# Patient Record
Sex: FEMALE | Race: WHITE | HISPANIC OR LATINO | URBAN - METROPOLITAN AREA
[De-identification: names, ages, dates, MRNs, and addresses within clinical notes are randomized per-mention and may not be internally consistent; named-entity substitution may affect disease eponyms.]

---

## 2018-06-05 ENCOUNTER — EMERGENCY (EMERGENCY)
Facility: HOSPITAL | Age: 23
LOS: 1 days | Discharge: HOME | End: 2018-06-05
Attending: EMERGENCY MEDICINE
Payer: COMMERCIAL

## 2018-06-05 VITALS
SYSTOLIC BLOOD PRESSURE: 135 MMHG | RESPIRATION RATE: 18 BRPM | DIASTOLIC BLOOD PRESSURE: 75 MMHG | WEIGHT: 216.93 LBS | HEIGHT: 66 IN | OXYGEN SATURATION: 98 % | TEMPERATURE: 98 F | HEART RATE: 98 BPM

## 2018-06-05 VITALS — DIASTOLIC BLOOD PRESSURE: 78 MMHG | HEART RATE: 88 BPM | TEMPERATURE: 98 F | SYSTOLIC BLOOD PRESSURE: 125 MMHG

## 2018-06-05 PROCEDURE — 99284 EMERGENCY DEPT VISIT MOD MDM: CPT | Mod: 25

## 2018-06-05 PROCEDURE — 99284 EMERGENCY DEPT VISIT MOD MDM: CPT

## 2018-06-05 RX ORDER — IBUPROFEN 200 MG
600 TABLET ORAL ONCE
Qty: 0 | Refills: 0 | Status: COMPLETED | OUTPATIENT
Start: 2018-06-05 | End: 2018-06-05

## 2018-06-05 RX ADMIN — Medication 600 MILLIGRAM(S): at 09:40

## 2018-06-05 RX ADMIN — Medication 40 MILLIGRAM(S): at 10:37

## 2018-06-05 RX ADMIN — Medication 600 MILLIGRAM(S): at 10:33

## 2018-06-05 NOTE — ED PROVIDER NOTE - OBJECTIVE STATEMENT
23 y/o F with no significant PMHx and no significant PSHx presents to the ED with c/o right arm tingling, pain and swelling x3 days. Pt reports pain may be related to recent job where she often lifts and carries heavy boxes. Pt states pain began in the palm of her hand and has since radiated up her arm. Pt took Aleve without relief of symptoms. Denies weakness, numbness or any other complaints. NKDA.

## 2018-06-05 NOTE — ED PROVIDER NOTE - MEDICAL DECISION MAKING DETAILS
feels better. discussed anticipatory guidance. return if not improving, if worsens, unable to follow up or any sx of concern

## 2018-06-06 NOTE — CONSULT NOTE ADULT - SUBJECTIVE AND OBJECTIVE BOX
HPI: Patient is a 21 y/o F who presents to the ED with c/o right arm tingling, pain and swelling x3 days. Pt reports pain may be related to recent job where she often lifts and carries heavy boxes. Pt states pain began in the palm of her hand and has since radiated up her arm.    PAST MEDICAL & SURGICAL HISTORY:  No pertinent past medical history  No significant past surgical history    Review of systems: Non Contributory    MEDICATIONS  (STANDING):    Allergies: No known Allergies    Vital Signs Last 24 Hrs  T(C): 36.7 (05 Jun 2018 10:49), Max: 36.7 (05 Jun 2018 10:49)  T(F): 98 (05 Jun 2018 10:49), Max: 98 (05 Jun 2018 10:49)  HR: 88 (05 Jun 2018 10:49) (88 - 88)  BP: 125/78 (05 Jun 2018 10:49) (125/78 - 125/78)  BP(mean): --  RR: --  SpO2: --    Physical Examination:    Musculoskeletal:         Neurovascularly Intact    RADIOLOGY & ADDITIONAL STUDIES:    ASSESSMENT:    PLAN/RECOMMENDATION:    FOLLOW UP: HPI: Patient is a 23 y/o F who presents to the ED with c/o right wrist pain and swelling x3 days. Pt reports pain may be related to recent job where she often lifts and carries heavy boxes. Pt states pain began in the palm of her hand and has since radiated up her arm.    PAST MEDICAL & SURGICAL HISTORY:  No pertinent past medical history  No significant past surgical history    Review of systems: Non Contributory    MEDICATIONS  (STANDING):    Allergies: No known Allergies    Vital Signs Last 24 Hrs  T(C): 36.7 (05 Jun 2018 10:49), Max: 36.7 (05 Jun 2018 10:49)  T(F): 98 (05 Jun 2018 10:49), Max: 98 (05 Jun 2018 10:49)  HR: 88 (05 Jun 2018 10:49) (88 - 88)  BP: 125/78 (05 Jun 2018 10:49) (125/78 - 125/78)  BP(mean): --  RR: --  SpO2: --    Physical Examination:    Musculoskeletal:   Physical examination of right wrist shows pain to palpation on the volar side of the wrist joint extending proximally to forearm area and distally toward the hand area. There is mild swelling noted. Range of motion is mildly decreased due to pain.     Neurovascularly Intact    RADIOLOGY & ADDITIONAL STUDIES: No X-rays were performed.     ASSESSMENT: Right wrist volar tendinitis, sprain.     PLAN/RECOMMENDATION: Under sterile condition, right wrist volar area was injected with 40 mg of Depomedrol and 5 CC of lidocaine. Patient tolerated procedure well and expressed relief of pain. Short arm splint/immobilizer was applied.     Apply ice. Take anti-inflammatory medication. Apply Voltaren gel.     FOLLOW UP: With office in 1-2 weeks

## 2019-02-06 NOTE — ED PROVIDER NOTE - TOBACCO USE
Received an DILEEP from [facility]. The forms have been sent to medical records for scanning and processing. 2/6/2019.    Never smoker

## 2022-08-01 ENCOUNTER — NON-APPOINTMENT (OUTPATIENT)
Age: 27
End: 2022-08-01

## 2022-08-06 ENCOUNTER — NON-APPOINTMENT (OUTPATIENT)
Age: 27
End: 2022-08-06

## 2022-08-10 ENCOUNTER — NON-APPOINTMENT (OUTPATIENT)
Age: 27
End: 2022-08-10

## 2022-11-15 ENCOUNTER — NON-APPOINTMENT (OUTPATIENT)
Age: 27
End: 2022-11-15

## 2023-03-06 ENCOUNTER — EMERGENCY (EMERGENCY)
Facility: HOSPITAL | Age: 28
LOS: 1 days | Discharge: HOME | End: 2023-03-06
Attending: EMERGENCY MEDICINE
Payer: COMMERCIAL

## 2023-03-06 VITALS
WEIGHT: 220.02 LBS | OXYGEN SATURATION: 99 % | SYSTOLIC BLOOD PRESSURE: 114 MMHG | RESPIRATION RATE: 18 BRPM | DIASTOLIC BLOOD PRESSURE: 79 MMHG | HEART RATE: 80 BPM | TEMPERATURE: 99 F | HEIGHT: 66 IN

## 2023-03-06 VITALS
OXYGEN SATURATION: 97 % | SYSTOLIC BLOOD PRESSURE: 113 MMHG | RESPIRATION RATE: 18 BRPM | TEMPERATURE: 98 F | DIASTOLIC BLOOD PRESSURE: 72 MMHG | HEART RATE: 78 BPM

## 2023-03-06 PROCEDURE — 73610 X-RAY EXAM OF ANKLE: CPT | Mod: 26,LT

## 2023-03-06 PROCEDURE — 73562 X-RAY EXAM OF KNEE 3: CPT

## 2023-03-06 PROCEDURE — 73610 X-RAY EXAM OF ANKLE: CPT

## 2023-03-06 PROCEDURE — 99284 EMERGENCY DEPT VISIT MOD MDM: CPT | Mod: 25

## 2023-03-06 PROCEDURE — 99284 EMERGENCY DEPT VISIT MOD MDM: CPT | Mod: 57

## 2023-03-06 PROCEDURE — 73562 X-RAY EXAM OF KNEE 3: CPT | Mod: 26,RT

## 2023-03-06 PROCEDURE — 29515 APPLICATION SHORT LEG SPLINT: CPT | Mod: LT

## 2023-03-06 PROCEDURE — 27824 TREAT LOWER LEG FRACTURE: CPT | Mod: 54

## 2023-03-06 RX ORDER — ACETAMINOPHEN 500 MG
650 TABLET ORAL ONCE
Refills: 0 | Status: COMPLETED | OUTPATIENT
Start: 2023-03-06 | End: 2023-03-06

## 2023-03-06 RX ORDER — IBUPROFEN 200 MG
600 TABLET ORAL ONCE
Refills: 0 | Status: COMPLETED | OUTPATIENT
Start: 2023-03-06 | End: 2023-03-06

## 2023-03-06 RX ADMIN — Medication 650 MILLIGRAM(S): at 11:35

## 2023-03-06 RX ADMIN — Medication 600 MILLIGRAM(S): at 11:05

## 2023-03-06 RX ADMIN — Medication 650 MILLIGRAM(S): at 11:05

## 2023-03-06 RX ADMIN — Medication 600 MILLIGRAM(S): at 11:35
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
## 2023-03-06 NOTE — ED PROVIDER NOTE - PATIENT PORTAL LINK FT
You can access the FollowMyHealth Patient Portal offered by [facility] by registering at the following website: [email or web address]. By joining [employer]’s FollowMyHealth portal, you will also be able to view your health information using other applications (apps) compatible with our system.
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
## 2023-03-06 NOTE — ED PROVIDER NOTE - NSFOLLOWUPCLINICS_GEN_ALL_ED_FT
[facility]  Podiatry/Wound Care  [address] [place] [address]  Phone: [phone]  Fax: [phone]
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
## 2023-03-06 NOTE — ED ADULT TRIAGE NOTE - PATIENT ON (OXYGEN DELIVERY METHOD)
room air HTN (hypertension)    Leg swelling    PVD (peripheral vascular disease)    Skin ulcer of left ankle, limited to breakdown of skin

## 2023-03-06 NOTE — ED ADULT TRIAGE NOTE - BMI (KG/M2)
"  Problem: Infection  Goal: Absence of Infection Signs and Symptoms  Outcome: Improving     Problem: Adult Inpatient Plan of Care  Goal: Optimal Comfort and Wellbeing  Outcome: Improving     Care resumed at 1730. Pt was found sleeping. Pt denies having any pain or discomfort. IV Vanco and IV Zosyn given. /60 (BP Location: Left arm)   Pulse 68   Temp 97.6  F (36.4  C) (Temporal)   Resp 17   Ht 1.651 m (5' 5\")   Wt 70 kg (154 lb 4.8 oz)   SpO2 99%. Able to ambulate in room and to the bathroom. Ate 75% of food for supper. Calm and pleasant with cares. Will continue to monitor.   " 35.5

## 2023-03-06 NOTE — ED PROVIDER NOTE - PHYSICAL EXAMINATION
Exam:  General: Patient well appearing, vital signs within normal limits  HEENT: airway patent with moist mucous membranes  Cardiac: RRR S1/S2 with strong peripheral pulses  Respiratory: lungs clear without respiratory distress  GI: abdomen soft, non tender, non distended  Neuro: no gross neurologic deficits  MSK: point tender L medial mallolus, some bruising of R thigh but soft compartments no deformity  Skin: warm, well perfused  Psych: normal mood and affect

## 2023-03-06 NOTE — ED ADULT TRIAGE NOTE - MEANS OF ARRIVAL
Detail Level: Detailed
37H
Plan: This patient has been treated today with image-guided superficial radiation therapy for non-melanoma \\nskin cancer. Written informed consent has been previously obtained from this patient for this treatment. This \\nconsent is documented in the patient's chart. The patient gave verbal consent to continue treatment today. \\nThe patient was treated with a specific radiation dose and setup as prescribed by the provider listed on this \\nvisit note. A Radiation Therapist performed administration of radiation under the supervision of a provider. \\nThe treatment parameters and cumulative dose are indicated above. Prior to administering the radiation, the \\npatient underwent a verification therapeutic radiology simulation-aided field setting defining relevant normal \\nand abnormal target anatomy and acquiring images with a separate and distinct diagnostic high-frequency \\nultrasound to delineate tissues and determine whether to proceed with delivery of therapeutic, in addition to \\nretrieve data necessary to develop an optimal radiation treatment process for the patient. The field \\nplacement simulation documents any change seen in overall tumor volume documented in the patient's \\nrecord, allows the clinician to indicate any needed changes in the treatment plan and/or prescription, \\nprovides diagnostic evaluation as the basis for performing the therapeutic procedure, and clearly identifies \\nthe information needed to decide to proceed with the therapeutic procedure. This process includes \\nverification of the treatment port and proper treatment positioning. All treatment ports were \\nphotographed within electronic medical records. The patient's lead blocking along with gross tumor volume \\nand margin was confirmed. Considering superficial radiotherapy is clinical in setup, this requires the physician \\nand radiation therapist to clarify the location interest being treated against initial images, ultrasound, \\npathology, and patient anatomy. Care was taken to ensure snell treated were geometrically accurate and \\nproperly positioned using therapeutic radiology simulation-aided field setting verification per fraction. This \\nprocess is also utilized to determine if any prescription or setup changes are necessary. These steps are \\ntherefore medically necessary to ensure safe and effective administration of radiation. Ongoing therapeutic \\nradiology simulation-aided field setting verification is ordered throughout the course of therapy.\\n\\nA high-frequency ultrasound image was acquired today for a two-dimensional evaluation of the tumor \\nvolume, depth, width, breadth, review of prior response to treatment, provide geometric accuracy of field \\nplacement, and determine whether to proceed with therapeutic delivery.\\n\\nHigh frequency ultrasound depth is 1.44  mm , which is - 0.67 mm in difference from previous imaging.\\n\\nThe field placement and ultrasound imaging, per fraction, is separate and distinct from the initial simulation \\nand is an important task in providing safe administration of superficial radiation therapy. Physician evaluation \\nof the ultrasound information will be ongoing throughout the course of treatment and is deemed medically \\nnecessary to ensure the efficacy of treatment, whether to proceed with therapeutic delivery, and determine\\apollo necessary changes. Today's images were evaluated for determination of continuation of treatment with \\nthe current plan or with necessary changes as appropriate. According to the review of verification \\ntherapeutic radiology simulation-aided field setting and imaging, no change is required. \\nAdditionally, the use of ultrasound visualization and targeted assessment allows the patient to be able to see \\nher cancer progress, encouraging the patient to complete and maintain compliance through the full \\ncourse of prescribed radiotherapy. Per Dr. ROSE Arias, continued ultrasound guidance and therapeutic radiology \\nsimulation-aided field setting verification per fraction is required for field placement, measurement of tumor \\ndepth, tissues evaluation, progress, acute effect monitoring, and determination for therapeutic treatment \\ndelivery is appropriate.
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
## 2023-03-08 ENCOUNTER — NON-APPOINTMENT (OUTPATIENT)
Age: 28
End: 2023-03-08

## 2023-03-09 ENCOUNTER — NON-APPOINTMENT (OUTPATIENT)
Age: 28
End: 2023-03-09

## 2023-03-09 ENCOUNTER — APPOINTMENT (OUTPATIENT)
Dept: ORTHOPEDIC SURGERY | Facility: CLINIC | Age: 28
End: 2023-03-09
Payer: COMMERCIAL

## 2023-03-09 VITALS
OXYGEN SATURATION: 98 % | BODY MASS INDEX: 33.34 KG/M2 | SYSTOLIC BLOOD PRESSURE: 127 MMHG | HEIGHT: 68 IN | WEIGHT: 220 LBS | DIASTOLIC BLOOD PRESSURE: 77 MMHG | HEART RATE: 98 BPM

## 2023-03-09 DIAGNOSIS — S89.91XA UNSPECIFIED INJURY OF RIGHT LOWER LEG, INITIAL ENCOUNTER: ICD-10-CM

## 2023-03-09 DIAGNOSIS — S83.004S UNSPECIFIED DISLOCATION OF RIGHT PATELLA, SEQUELA: ICD-10-CM

## 2023-03-09 DIAGNOSIS — S82.52XA DISPLACED FRACTURE OF MEDIAL MALLEOLUS OF LEFT TIBIA, INITIAL ENCOUNTER FOR CLOSED FRACTURE: ICD-10-CM

## 2023-03-09 DIAGNOSIS — S83.016A: ICD-10-CM

## 2023-03-09 PROCEDURE — 73564 X-RAY EXAM KNEE 4 OR MORE: CPT | Mod: RT

## 2023-03-09 PROCEDURE — 73600 X-RAY EXAM OF ANKLE: CPT | Mod: LT

## 2023-03-09 PROCEDURE — 73590 X-RAY EXAM OF LOWER LEG: CPT | Mod: 1L,LT

## 2023-03-09 PROCEDURE — 99204 OFFICE O/P NEW MOD 45 MIN: CPT

## 2023-03-09 PROCEDURE — 99213 OFFICE O/P EST LOW 20 MIN: CPT | Mod: 1L
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
## 2023-03-09 NOTE — PHYSICAL EXAM
[de-identified] : Left Ankle:\par APPEARANCE: [default value] swelling, no marked deformities  or malalignment\par POSITIVE TENDERNESS: ATFL, CFL, Lateral ankle\par NONTENDER: medial malleolus, lateral malleolus, tibialis posterior tendon, achilles tendon, no marked thickening of tendon, PTFL, anterior tibiofibular ligament (high ankle), sinus tarsus, deltoid ligaments, 5th metatarsal. \par RANGE OF MOTION: Mild limitation of PF and Inversion due to pain/swelling, NL DF and Eversion. \par RESISTIVE TESTING: Mild pain with resisted eversion and DF.  painless resisted  plantar flexion, and inversion. \par SPECIAL TESTS: + anterior drawer for pain without laxity, + talar tilt for pain without laxity, neg Kleiger's\par \par PULSES: 2+ DP/PT pulses\par Neuro: NL sensation of ankle, foot and toes, Achilles 2+/4\par \par B/L Hips: No asymmetry, malalignment, or swelling, Full ROM, 5/5 strength in flexion/ext, IR/ER, Abd/Add, Joints stable\par B/L Knees: No asymmetry, malalignment, or swelling, Full ROM, 5/5 strength in Flex/Ext, Joints stable\par BIOMECHANICAL EXAM: no marked leg length discrepancy, [default value]hip abductor weakness b/l, no marked foot pronation, tight hams and ITB b/l.  Normal gait and station\par  [de-identified] : \par The following radiographs were ordered and read by me during this patient's visit. I reviewed each radiograph in detail with the patient and discussed the findings as highlighted below. \par \par 4 views of the right knee were obtained today that show no fracture, or dislocation. There are no degenerative changes seen. There is no malalignment. No obvious osseous abnormality. Otherwise unremarkable. \par \par \par \par 3 views of the left ankle were obtained today that show a minimally displaced medial malleolus fracture
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
## 2023-03-15 ENCOUNTER — OUTPATIENT (OUTPATIENT)
Dept: OUTPATIENT SERVICES | Facility: HOSPITAL | Age: 28
LOS: 1 days | End: 2023-03-15
Payer: COMMERCIAL

## 2023-03-15 ENCOUNTER — APPOINTMENT (OUTPATIENT)
Dept: MRI IMAGING | Facility: CLINIC | Age: 28
End: 2023-03-15
Payer: COMMERCIAL

## 2023-03-15 DIAGNOSIS — S89.91XA UNSPECIFIED INJURY OF RIGHT LOWER LEG, INITIAL ENCOUNTER: ICD-10-CM

## 2023-03-15 PROCEDURE — 73721 MRI JNT OF LWR EXTRE W/O DYE: CPT | Mod: 26,RT

## 2023-03-15 PROCEDURE — 73721 MRI JNT OF LWR EXTRE W/O DYE: CPT

## 2023-03-22 ENCOUNTER — APPOINTMENT (OUTPATIENT)
Dept: ORTHOPEDIC SURGERY | Facility: CLINIC | Age: 28
End: 2023-03-22
Payer: COMMERCIAL

## 2023-03-22 ENCOUNTER — TRANSCRIPTION ENCOUNTER (OUTPATIENT)
Age: 28
End: 2023-03-22

## 2023-03-22 PROBLEM — S83.004S: Status: ACTIVE | Noted: 2023-03-22

## 2023-03-22 PROBLEM — S83.016A: Status: ACTIVE | Noted: 2023-03-22

## 2023-03-22 PROCEDURE — 99213 OFFICE O/P EST LOW 20 MIN: CPT

## 2023-03-22 PROCEDURE — 73610 X-RAY EXAM OF ANKLE: CPT | Mod: LT

## 2023-03-22 NOTE — PHYSICAL EXAM
[de-identified] : The patient is sitting comfortably in the exam room. \par LEFT ankle\par -Skin is intact, no swelling, no ecchymosis\par -No pain with palpation over the medial malleolus\par -No pain with palpation over the dorsum of the foot, no plantar ecchymoses, no pain with movement of the first metatarsal relative to the second metatarsal\par -No subluxation of the peroneal tendons\par -Dorsiflexion: Neutral, Plantarflexion: 30\par -Sensation is intact L1-S1\par -5/5 EHL, FHL, TA, GS\par -Foot is warm and well-perfused, palpable dorsalis pedis pulse\par \par Right knee\par -Mild swelling, skin intact, no ecchymosis\par -Pain with palpation over the medial aspect of the distal femur\par -Patient is guarding and I am unable to perform a Lachman, posterior drawer, anterior drawer, John's\par -Stable to varus and valgus stress sensation intact throughout the leg\par -Foot warm and well-perfused\par -Neuro intact throughout\par - [de-identified] : X-rays of left ankle from the emergency room at [facility] show a trimalleolar ankle fracture.
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
## 2023-03-23 ENCOUNTER — OUTPATIENT (OUTPATIENT)
Dept: OUTPATIENT SERVICES | Facility: HOSPITAL | Age: 28
LOS: 1 days | End: 2023-03-23
Payer: COMMERCIAL

## 2023-03-23 VITALS
DIASTOLIC BLOOD PRESSURE: 90 MMHG | HEART RATE: 95 BPM | RESPIRATION RATE: 16 BRPM | HEIGHT: 66 IN | TEMPERATURE: 99 F | WEIGHT: 225.09 LBS | OXYGEN SATURATION: 98 % | SYSTOLIC BLOOD PRESSURE: 139 MMHG

## 2023-03-23 DIAGNOSIS — S82.892A OTHER FRACTURE OF LEFT LOWER LEG, INITIAL ENCOUNTER FOR CLOSED FRACTURE: ICD-10-CM

## 2023-03-23 DIAGNOSIS — Z01.818 ENCOUNTER FOR OTHER PREPROCEDURAL EXAMINATION: ICD-10-CM

## 2023-03-23 DIAGNOSIS — Z98.890 OTHER SPECIFIED POSTPROCEDURAL STATES: Chronic | ICD-10-CM

## 2023-03-23 DIAGNOSIS — Z90.89 ACQUIRED ABSENCE OF OTHER ORGANS: Chronic | ICD-10-CM

## 2023-03-23 DIAGNOSIS — S82.52XA DISPLACED FRACTURE OF MEDIAL MALLEOLUS OF LEFT TIBIA, INITIAL ENCOUNTER FOR CLOSED FRACTURE: ICD-10-CM

## 2023-03-23 DIAGNOSIS — K08.409 PARTIAL LOSS OF TEETH, UNSPECIFIED CAUSE, UNSPECIFIED CLASS: Chronic | ICD-10-CM

## 2023-03-23 LAB
ANION GAP SERPL CALC-SCNC: 8 MMOL/L — SIGNIFICANT CHANGE UP (ref 5–17)
BUN SERPL-MCNC: 10 MG/DL — SIGNIFICANT CHANGE UP (ref 7–23)
CALCIUM SERPL-MCNC: 9 MG/DL — SIGNIFICANT CHANGE UP (ref 8.4–10.5)
CHLORIDE SERPL-SCNC: 104 MMOL/L — SIGNIFICANT CHANGE UP (ref 96–108)
CO2 SERPL-SCNC: 27 MMOL/L — SIGNIFICANT CHANGE UP (ref 22–31)
CREAT SERPL-MCNC: 0.62 MG/DL — SIGNIFICANT CHANGE UP (ref 0.5–1.3)
EGFR: 124 ML/MIN/1.73M2 — SIGNIFICANT CHANGE UP
GLUCOSE SERPL-MCNC: 75 MG/DL — SIGNIFICANT CHANGE UP (ref 70–99)
HCT VFR BLD CALC: 38.1 % — SIGNIFICANT CHANGE UP (ref 34.5–45)
HGB BLD-MCNC: 12.2 G/DL — SIGNIFICANT CHANGE UP (ref 11.5–15.5)
MCHC RBC-ENTMCNC: 30 PG — SIGNIFICANT CHANGE UP (ref 27–34)
MCHC RBC-ENTMCNC: 32 GM/DL — SIGNIFICANT CHANGE UP (ref 32–36)
MCV RBC AUTO: 93.6 FL — SIGNIFICANT CHANGE UP (ref 80–100)
NRBC # BLD: 0 /100 WBCS — SIGNIFICANT CHANGE UP (ref 0–0)
PLATELET # BLD AUTO: 289 K/UL — SIGNIFICANT CHANGE UP (ref 150–400)
POTASSIUM SERPL-MCNC: 4 MMOL/L — SIGNIFICANT CHANGE UP (ref 3.5–5.3)
POTASSIUM SERPL-SCNC: 4 MMOL/L — SIGNIFICANT CHANGE UP (ref 3.5–5.3)
RBC # BLD: 4.07 M/UL — SIGNIFICANT CHANGE UP (ref 3.8–5.2)
RBC # FLD: 13.1 % — SIGNIFICANT CHANGE UP (ref 10.3–14.5)
SODIUM SERPL-SCNC: 139 MMOL/L — SIGNIFICANT CHANGE UP (ref 135–145)
WBC # BLD: 5.89 K/UL — SIGNIFICANT CHANGE UP (ref 3.8–10.5)
WBC # FLD AUTO: 5.89 K/UL — SIGNIFICANT CHANGE UP (ref 3.8–10.5)

## 2023-03-23 NOTE — H&P PST ADULT - NSICDXPASTSURGICALHX_GEN_ALL_CORE_FT
PAST SURGICAL HISTORY:  H/O wisdom tooth extraction     S/P arthroscopic surgery of left knee     S/P tonsillectomy

## 2023-03-23 NOTE — H&P PST ADULT - PROBLEM SELECTOR PLAN 1
ORIF left ankle  PST labs send  preprocedure instructions discussed ORIF left ankle  PST labs send  preprocedure instructions discussed  Hold marijuana use am of sx
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
## 2023-03-23 NOTE — H&P PST ADULT - NSICDXPASTMEDICALHX_GEN_ALL_CORE_FT
PAST MEDICAL HISTORY:  2019 novel coronavirus disease (COVID-19)     Ankle fracture, left     H/O eczema     Knee pain, right     Obese     Torn meniscus

## 2023-03-23 NOTE — H&P PST ADULT - ATTENDING COMMENTS
Associated images, notes, and labs were reviewed. The patient was seen and examined. Risks and benefits of operative versus nonoperative management were discussed with the patient. The patient showed a good understanding of the injury and the treatment options. They would like to move forward with operative management of the ankle fracture.

## 2023-03-27 ENCOUNTER — TRANSCRIPTION ENCOUNTER (OUTPATIENT)
Age: 28
End: 2023-03-27

## 2023-03-27 NOTE — PHYSICAL EXAM
[de-identified] : The patient is sitting comfortably in the exam room. \par LEFT ankle\par -Skin is intact, no swelling, no ecchymosis\par -No pain with palpation over the medial malleolus\par -No pain with palpation over the dorsum of the foot, no plantar ecchymoses, no pain with movement of the first metatarsal relative to the second metatarsal\par -No subluxation of the peroneal tendons\par -Dorsiflexion: , Plantarflexion:\par -Sensation is intact L1-S1\par -5/5 EHL, FHL, TA, GS\par -Foot is warm and well-perfused, palpable dorsalis pedis pulse\par  [de-identified] : MRI from 3/15/23\par \par IMPRESSION:\par \par 1. High-grade tear of the anterior cruciate ligament and medial collateral ligament.\par 2. Impaction fracture at the sulcus terminalis of the lateral femoral condyle. Osseous contusion of the tibia.\par 3. No meniscal tear.\par 4. Moderate size joint effusion with mild synovitis.\par 5. Subcutaneous and soft tissue edema at the anterior aspect of the knee and popliteal fossa.\par \par X-rays of the left ankle were taken in the office today including AP, mortise, lateral.  X-rays show a minimally displaced medial malleolus fracture that extends into the weightbearing portion of the distal tibial plafond.  There is also likely a nondisplaced fracture of the distal fibula which is difficult to appreciate due to the splint.  The ankle is reduced.
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
## 2023-03-28 ENCOUNTER — TRANSCRIPTION ENCOUNTER (OUTPATIENT)
Age: 28
End: 2023-03-28

## 2023-03-28 ENCOUNTER — OUTPATIENT (OUTPATIENT)
Dept: OUTPATIENT SERVICES | Facility: HOSPITAL | Age: 28
LOS: 1 days | End: 2023-03-28
Payer: COMMERCIAL

## 2023-03-28 ENCOUNTER — APPOINTMENT (OUTPATIENT)
Dept: ORTHOPEDIC SURGERY | Facility: HOSPITAL | Age: 28
End: 2023-03-28
Payer: COMMERCIAL

## 2023-03-28 VITALS — HEIGHT: 65.98 IN | WEIGHT: 225.09 LBS

## 2023-03-28 VITALS
SYSTOLIC BLOOD PRESSURE: 111 MMHG | RESPIRATION RATE: 17 BRPM | TEMPERATURE: 98 F | HEART RATE: 91 BPM | OXYGEN SATURATION: 98 % | DIASTOLIC BLOOD PRESSURE: 60 MMHG

## 2023-03-28 DIAGNOSIS — Z98.890 OTHER SPECIFIED POSTPROCEDURAL STATES: Chronic | ICD-10-CM

## 2023-03-28 DIAGNOSIS — Z90.89 ACQUIRED ABSENCE OF OTHER ORGANS: Chronic | ICD-10-CM

## 2023-03-28 DIAGNOSIS — Z01.818 ENCOUNTER FOR OTHER PREPROCEDURAL EXAMINATION: ICD-10-CM

## 2023-03-28 DIAGNOSIS — K08.409 PARTIAL LOSS OF TEETH, UNSPECIFIED CAUSE, UNSPECIFIED CLASS: Chronic | ICD-10-CM

## 2023-03-28 DIAGNOSIS — S82.52XA DISPLACED FRACTURE OF MEDIAL MALLEOLUS OF LEFT TIBIA, INITIAL ENCOUNTER FOR CLOSED FRACTURE: ICD-10-CM

## 2023-03-28 LAB — HCG UR QL: NEGATIVE — SIGNIFICANT CHANGE UP

## 2023-03-28 PROCEDURE — C1713: CPT

## 2023-03-28 PROCEDURE — XXXXX: CPT | Mod: 1L

## 2023-03-28 PROCEDURE — 27814 TREATMENT OF ANKLE FRACTURE: CPT | Mod: LT

## 2023-03-28 PROCEDURE — G0463: CPT

## 2023-03-28 PROCEDURE — 80048 BASIC METABOLIC PNL TOTAL CA: CPT

## 2023-03-28 PROCEDURE — C1889: CPT

## 2023-03-28 PROCEDURE — 81025 URINE PREGNANCY TEST: CPT

## 2023-03-28 PROCEDURE — C1769: CPT

## 2023-03-28 PROCEDURE — 27766 OPTX MEDIAL ANKLE FX: CPT | Mod: 1L,LT

## 2023-03-28 PROCEDURE — 97161 PT EVAL LOW COMPLEX 20 MIN: CPT

## 2023-03-28 PROCEDURE — 85027 COMPLETE CBC AUTOMATED: CPT

## 2023-03-28 PROCEDURE — 76000 FLUOROSCOPY <1 HR PHYS/QHP: CPT

## 2023-03-28 DEVICE — SCREW CORT 2.7X22MM: Type: IMPLANTABLE DEVICE | Site: LEFT | Status: FUNCTIONAL

## 2023-03-28 DEVICE — IMPLANTABLE DEVICE: Type: IMPLANTABLE DEVICE | Site: LEFT | Status: FUNCTIONAL

## 2023-03-28 DEVICE — WASHER TI 4MM: Type: IMPLANTABLE DEVICE | Site: LEFT | Status: FUNCTIONAL

## 2023-03-28 DEVICE — SCREW CORT 2.7X38MM: Type: IMPLANTABLE DEVICE | Site: LEFT | Status: FUNCTIONAL

## 2023-03-28 DEVICE — SCREW CORT 2.7X30MM: Type: IMPLANTABLE DEVICE | Site: LEFT | Status: FUNCTIONAL

## 2023-03-28 DEVICE — GUIDEWIRE UNTHREADED 1.4X150MM: Type: IMPLANTABLE DEVICE | Site: LEFT | Status: FUNCTIONAL

## 2023-03-28 RX ORDER — ONDANSETRON 8 MG/1
4 TABLET, FILM COATED ORAL ONCE
Refills: 0 | Status: DISCONTINUED | OUTPATIENT
Start: 2023-03-28 | End: 2023-03-28

## 2023-03-28 RX ORDER — OXYCODONE HYDROCHLORIDE 5 MG/1
1 TABLET ORAL
Qty: 20 | Refills: 0
Start: 2023-03-28 | End: 2023-04-01

## 2023-03-28 RX ORDER — ASPIRIN/CALCIUM CARB/MAGNESIUM 324 MG
1 TABLET ORAL
Qty: 60 | Refills: 0
Start: 2023-03-28 | End: 2023-04-26

## 2023-03-28 RX ORDER — HYDROMORPHONE HYDROCHLORIDE 2 MG/ML
1 INJECTION INTRAMUSCULAR; INTRAVENOUS; SUBCUTANEOUS
Refills: 0 | Status: DISCONTINUED | OUTPATIENT
Start: 2023-03-28 | End: 2023-03-28

## 2023-03-28 RX ORDER — CEFAZOLIN SODIUM 1 G
2000 VIAL (EA) INJECTION ONCE
Refills: 0 | Status: COMPLETED | OUTPATIENT
Start: 2023-03-28 | End: 2023-03-28

## 2023-03-28 RX ORDER — HYDROMORPHONE HYDROCHLORIDE 2 MG/ML
0.5 INJECTION INTRAMUSCULAR; INTRAVENOUS; SUBCUTANEOUS
Refills: 0 | Status: DISCONTINUED | OUTPATIENT
Start: 2023-03-28 | End: 2023-03-28

## 2023-03-28 RX ORDER — IBUPROFEN 200 MG
0 TABLET ORAL
Qty: 0 | Refills: 0 | DISCHARGE

## 2023-03-28 RX ORDER — OXYCODONE HYDROCHLORIDE 5 MG/1
5 TABLET ORAL ONCE
Refills: 0 | Status: DISCONTINUED | OUTPATIENT
Start: 2023-03-28 | End: 2023-03-28

## 2023-03-28 RX ADMIN — HYDROMORPHONE HYDROCHLORIDE 0.5 MILLIGRAM(S): 2 INJECTION INTRAMUSCULAR; INTRAVENOUS; SUBCUTANEOUS at 14:12

## 2023-03-28 RX ADMIN — HYDROMORPHONE HYDROCHLORIDE 0.5 MILLIGRAM(S): 2 INJECTION INTRAMUSCULAR; INTRAVENOUS; SUBCUTANEOUS at 14:02

## 2023-03-28 NOTE — ASU DISCHARGE PLAN (ADULT/PEDIATRIC) - NS MD DC FALL RISK RISK
For information on Fall & Injury Prevention, visit: [email or web address] OR  [email or web address] OR  https://www.cdc.gov/steadi/patient.html

## 2023-03-28 NOTE — ASU DISCHARGE PLAN (ADULT/PEDIATRIC) - CARE PROVIDER_API CALL
Efrain Castelan)  Orthopedics  [address] [place] [address]  Phone: [phone]  Fax: [phone]  Follow Up Time: 2 weeks

## 2023-03-28 NOTE — ASU PATIENT PROFILE, ADULT - FALL HARM RISK - UNIVERSAL INTERVENTIONS
Bed in lowest position, wheels locked, appropriate side rails in place/Call bell, personal items and telephone in reach/Instruct patient to call for assistance before getting out of bed or chair/Non-slip footwear when patient is out of bed/[place] to call system/Purposeful Proactive Rounding/Room/bathroom lighting operational, light cord in reach

## 2023-03-28 NOTE — PHYSICAL THERAPY INITIAL EVALUATION ADULT - ADDITIONAL COMMENTS
Pt resides in an apartment w/ brother, +elevator, no steps to enter building. PTA pt was independent w/ all functional mobility & did not use an AD for ambulation.

## 2023-03-28 NOTE — DISCHARGE NOTE NURSING/CASE MANAGEMENT/SOCIAL WORK - PATIENT PORTAL LINK FT
You can access the FollowMyHealth Patient Portal offered by [facility] by registering at the following website: [email or web address]. By joining [facility] FollowMyHealth portal, you will also be able to view your health information using other applications (apps) compatible with our system.

## 2023-03-28 NOTE — ASU PATIENT PROFILE, ADULT - AS SC BRADEN ACTIVITY
Incision and drain  Date/Time: 3/3/2020 11:12 AM  Performed by: Hector Chaidez MD  Authorized by: Hector Chaidez MD     Patient location:  Bedside  Other Assisting Provider: No    Consent:     Consent obtained:  Written    Consent given by:  Patient    Risks discussed:  Bleeding, incomplete drainage, pain and infection    Alternatives discussed:  Delayed treatment and alternative treatment  Universal protocol:     Procedure explained and questions answered to patient or proxy's satisfaction: yes      Relevant documents present and verified: yes      Test results available and properly labeled: yes      Radiology Images displayed and confirmed  If images not available, report reviewed: yes      Site/side marked: yes      Immediately prior to procedure a time out was called: yes      Patient identity confirmed:  Arm band  Location:     Type:  Abscess    Size:  2 5 cm    Location:  Head/neck    Head/neck location:  Face  Pre-procedure details:     Skin preparation:  Betadine  Anesthesia (see MAR for exact dosages): Anesthesia method:  Local infiltration    Local anesthetic:  Lidocaine 1% WITH epi  Procedure details:     Complexity:  Simple    Needle aspiration: no      Incision types:  Cruciate    Scalpel blade:  15    Approach:  Open    Incision depth:  Subcutaneous    Wound management:  Probed and deloculated    Irrigation with saline:  Irrigated with local anesthesia    Drainage:  Bloody and purulent    Drainage amount:  Scant    Wound treatment:  Packing placed    Packing materials:  1/2 in iodoform gauze    Amount 1/2" iodoform:  3 "  Post-procedure details:     Patient tolerance of procedure:   Tolerated well, no immediate complications (4) walks frequently
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
## 2023-03-28 NOTE — DISCHARGE NOTE NURSING/CASE MANAGEMENT/SOCIAL WORK - NSDCPEFALRISK_GEN_ALL_CORE
For information on Fall & Injury Prevention, visit: [email or web address] OR  [email or web address] OR  https://www.cdc.gov/steadi/patient.html

## 2023-04-07 ENCOUNTER — APPOINTMENT (OUTPATIENT)
Dept: ORTHOPEDIC SURGERY | Facility: CLINIC | Age: 28
End: 2023-04-07
Payer: COMMERCIAL

## 2023-04-07 VITALS — HEIGHT: 66 IN | WEIGHT: 225 LBS | BODY MASS INDEX: 36.16 KG/M2

## 2023-04-07 DIAGNOSIS — S83.511A SPRAIN OF ANTERIOR CRUCIATE LIGAMENT OF RIGHT KNEE, INITIAL ENCOUNTER: ICD-10-CM

## 2023-04-07 DIAGNOSIS — S83.411A SPRAIN OF MEDIAL COLLATERAL LIGAMENT OF RIGHT KNEE, INITIAL ENCOUNTER: ICD-10-CM

## 2023-04-07 PROCEDURE — 99213 OFFICE O/P EST LOW 20 MIN: CPT

## 2023-04-13 ENCOUNTER — APPOINTMENT (OUTPATIENT)
Dept: ORTHOPEDIC SURGERY | Facility: CLINIC | Age: 28
End: 2023-04-13
Payer: COMMERCIAL

## 2023-04-13 DIAGNOSIS — S82.852K: ICD-10-CM

## 2023-04-13 DIAGNOSIS — S82.842A DISPLACED BIMALLEOLAR FRACTURE OF LEFT LOWER LEG, INITIAL ENCOUNTER FOR CLOSED FRACTURE: ICD-10-CM

## 2023-04-13 PROBLEM — M25.561 PAIN IN RIGHT KNEE: Chronic | Status: ACTIVE | Noted: 2023-03-23

## 2023-04-13 PROBLEM — Z87.2 PERSONAL HISTORY OF DISEASES OF THE SKIN AND SUBCUTANEOUS TISSUE: Chronic | Status: ACTIVE | Noted: 2023-03-23

## 2023-04-13 PROBLEM — U07.1 COVID-19: Chronic | Status: ACTIVE | Noted: 2023-03-23

## 2023-04-13 PROBLEM — S82.892A OTHER FRACTURE OF LEFT LOWER LEG, INITIAL ENCOUNTER FOR CLOSED FRACTURE: Chronic | Status: ACTIVE | Noted: 2023-03-23

## 2023-04-13 PROBLEM — E66.9 OBESITY, UNSPECIFIED: Chronic | Status: ACTIVE | Noted: 2023-03-23

## 2023-04-13 PROBLEM — S83.209A UNSPECIFIED TEAR OF UNSPECIFIED MENISCUS, CURRENT INJURY, UNSPECIFIED KNEE, INITIAL ENCOUNTER: Chronic | Status: ACTIVE | Noted: 2023-03-23

## 2023-04-13 PROCEDURE — 99024 POSTOP FOLLOW-UP VISIT: CPT

## 2023-04-13 NOTE — HISTORY OF PRESENT ILLNESS
[Chills] : no chills [Constipation] : no constipation [Diarrhea] : no diarrhea [Dysuria] : no dysuria [Fever] : no fever [Nausea] : no nausea [Vomiting] : no vomiting [de-identified] : s/p ORIF of the left ankle, DOS: 3/28/23 [de-identified] : RAMON Alex is a 27 y.o. woman who presents to the office for a post op s/p ORIF of the left ankle on 3/28/23. SInce her surgery she states she is feeling better. She is taking ES Tylenol for pain with relief and will take oxycodone is needed. She is ambulating with bilateral crutches and has been nonweightbearing.  [de-identified] : The patient is sitting comfortably in the exam room. \par LEFT ankle\par -Skin is intact, no swelling, no ecchymosis\par -Incision is clean and dry, no erythema, no signs of infection\par -No subluxation of the peroneal tendons\par -Dorsiflexion: Neutral, Plantarflexion: 30\par -Sensation is intact L1-S1\par -5/5 EHL, FHL, TA, GS\par -Foot is warm and well-perfused, palpable dorsalis pedis pulse\par  [de-identified] : No Xrays were taken in the office today, 4/13/23. [de-identified] : 27-year-old woman s/p ORIF left ankle, approximately 2 weeks out. [de-identified] : -Nonweightbearing left LE with crutches and CAM boot\par -Range of motion exercises were demonstrated in the office today\par -CAM boot \par -Physical therapy: to improve ROM and prevent stiffness\par -Follow-up in 1 month with x-rays of the left ankle at that time\par -All the patient's questions and concerns were addressed during this visit\par

## 2023-05-17 ENCOUNTER — APPOINTMENT (OUTPATIENT)
Dept: ORTHOPEDIC SURGERY | Facility: CLINIC | Age: 28
End: 2023-05-17
Payer: COMMERCIAL

## 2023-05-17 VITALS
OXYGEN SATURATION: 97 % | HEART RATE: 120 BPM | BODY MASS INDEX: 36.16 KG/M2 | TEMPERATURE: 97 F | HEIGHT: 66 IN | DIASTOLIC BLOOD PRESSURE: 83 MMHG | WEIGHT: 225 LBS | SYSTOLIC BLOOD PRESSURE: 139 MMHG

## 2023-05-17 DIAGNOSIS — S82.52XA DISPLACED FRACTURE OF MEDIAL MALLEOLUS OF LEFT TIBIA, INITIAL ENCOUNTER FOR CLOSED FRACTURE: ICD-10-CM

## 2023-05-17 PROCEDURE — XXXXX: CPT | Mod: 1L

## 2023-06-22 ENCOUNTER — APPOINTMENT (OUTPATIENT)
Dept: ORTHOPEDIC SURGERY | Facility: CLINIC | Age: 28
End: 2023-06-22
Payer: COMMERCIAL

## 2023-06-22 PROCEDURE — XXXXX: CPT | Mod: 1L

## 2023-06-22 NOTE — HISTORY OF PRESENT ILLNESS
[Chills] : no chills [Constipation] : no constipation [Diarrhea] : no diarrhea [Dysuria] : no dysuria [Fever] : no fever [Nausea] : no nausea [Vomiting] : no vomiting [de-identified] : s/p ORIF of the left ankle, DOS: 3/28/23 [de-identified] : RAMON Alex is a 27 y.o. woman who presents to the office for a post op s/p ORIF of the left ankle on 3/28/23. SInce her last visit she states she is feeling better. She presents in a lace up ankle brace. She notes pain at her heel and her knee when ambulating for prolonged periods of time. She is participating in PT twice a week.  [de-identified] : The patient is sitting comfortably in the exam room. \par LEFT ankle\par -Skin is intact, no swelling, no ecchymosis\par -Incision is well-healed, no erythema, no signs of infection\par -No subluxation of the peroneal tendons\par -Dorsiflexion: 5, Plantarflexion: 45\par -Sensation is intact L1-S1\par -5/5 EHL, FHL, TA, GS\par -Foot is warm and well-perfused, palpable dorsalis pedis pulse\par  [de-identified] : Xrays of the left ankle were taken in the office today, 6/22/23. [de-identified] : 27-year-old woman s/p ORIF left ankle, approximately 12 weeks out. [de-identified] : -Weightbearing left LE \par -Physical therapy: to improve ROM, strengthen and gait training\par -Follow-up in 3 months with x-rays of the left ankle at that time\par -Return to work: End of July\par -All the patient's questions and concerns were addressed during this visit\par

## 2023-06-30 ENCOUNTER — APPOINTMENT (OUTPATIENT)
Dept: ORTHOPEDIC SURGERY | Facility: CLINIC | Age: 28
End: 2023-06-30
Payer: COMMERCIAL

## 2023-06-30 PROCEDURE — 99213 OFFICE O/P EST LOW 20 MIN: CPT
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
## 2023-07-07 ENCOUNTER — APPOINTMENT (OUTPATIENT)
Dept: ORTHOPEDIC SURGERY | Facility: CLINIC | Age: 28
End: 2023-07-07
Payer: COMMERCIAL

## 2023-07-07 VITALS
SYSTOLIC BLOOD PRESSURE: 142 MMHG | OXYGEN SATURATION: 98 % | WEIGHT: 22 LBS | DIASTOLIC BLOOD PRESSURE: 88 MMHG | HEART RATE: 110 BPM | HEIGHT: 66 IN | BODY MASS INDEX: 3.54 KG/M2

## 2023-07-07 DIAGNOSIS — S83.419D SPRAIN OF MEDIAL COLLATERAL LIGAMENT OF UNSPECIFIED KNEE, SUBSEQUENT ENCOUNTER: ICD-10-CM

## 2023-07-07 DIAGNOSIS — S83.519D SPRAIN OF ANTERIOR CRUCIATE LIGAMENT OF UNSPECIFIED KNEE, SUBSEQUENT ENCOUNTER: ICD-10-CM

## 2023-07-07 PROCEDURE — 99212 OFFICE O/P EST SF 10 MIN: CPT

## 2023-09-01 ENCOUNTER — APPOINTMENT (OUTPATIENT)
Dept: ORTHOPEDIC SURGERY | Facility: CLINIC | Age: 28
End: 2023-09-01

## 2023-09-05 NOTE — HISTORY OF PRESENT ILLNESS
[Chills] : no chills [Constipation] : no constipation [Diarrhea] : no diarrhea [Dysuria] : no dysuria [Fever] : no fever [Nausea] : no nausea [Vomiting] : no vomiting [de-identified] : s/p ORIF of the left ankle, DOS: 3/28/23 [de-identified] : RAMON Alex is a 27 y.o. woman who presents to the office for a post op s/p ORIF of the left ankle on 3/28/23. SInce her last visit she states she is feeling better. She is participating in PT twice a week. She is ambulating with bilateral crutches and has been nonweightbearing.  She is not taking any medication for pain.  [de-identified] : The patient is sitting comfortably in the exam room.  LEFT ankle -Skin is intact, no swelling, no ecchymosis -Incision is clean and dry, no erythema, no signs of infection -No subluxation of the peroneal tendons -Dorsiflexion: Neutral, Plantarflexion: 45 -Sensation is intact L1-S1 -5/5 EHL, FHL, TA, GS -Foot is warm and well-perfused, palpable dorsalis pedis pulse  [de-identified] : Xrays of the left ankle were taken in the office today, 5/17/23.X-rays show good overall alignment of the ankle.  No displacement of the fractures.  Implants are in good position. [de-identified] : 27-year-old woman s/p ORIF left ankle, approximately 7 weeks out. [de-identified] : -Weightbearing left LE with CAM boot\par -Transition out of CAM boot into lace up ankle brace in 2 weeks: 5/31/23\par -Lace up ankle brace prescribed\par -Physical therapy: to improve ROM, strengthen and gait training\par -Follow-up in 2 months with x-rays of the left ankle at that time\par -All the patient's questions and concerns were addressed during this visit\par

## 2023-09-28 ENCOUNTER — APPOINTMENT (OUTPATIENT)
Dept: ORTHOPEDIC SURGERY | Facility: CLINIC | Age: 28
End: 2023-09-28

## 2024-08-20 NOTE — ASU PREOP CHECKLIST - HEART RATE (BEATS/MIN)
The form was sent to the Physician's Nurse MSG Pool via In-Basket for review and signature.    Completed form will be faxed to [facility] at [phone].     Sent processing check to QA for review     77

## 2025-01-02 ENCOUNTER — NON-APPOINTMENT (OUTPATIENT)
Age: 30
End: 2025-01-02

## (undated) DEVICE — DRSG CURITY GAUZE SPONGE 4 X 4" 12-PLY

## (undated) DEVICE — POSITIONER FOAM HEADREST (PINK)

## (undated) DEVICE — SUT MONOSOF 3-0 18" C-14

## (undated) DEVICE — WARMING BLANKET UPPER ADULT

## (undated) DEVICE — VENODYNE/SCD SLEEVE CALF LARGE

## (undated) DEVICE — DRSG WEBRIL 4"

## (undated) DEVICE — SUT POLYSORB 0 30" GS-21 UNDYED

## (undated) DEVICE — DRSG STOCKINETTE IMPERVIOUS LG

## (undated) DEVICE — SOL IRR POUR H2O 250ML

## (undated) DEVICE — GLV 8.5 PROTEXIS (WHITE)

## (undated) DEVICE — DRILL BIT STRYKER 2MM

## (undated) DEVICE — DRSG ACE BANDAGE 4" NS

## (undated) DEVICE — GLV 7.5 PROTEXIS (WHITE)

## (undated) DEVICE — PACK EXTREMITY

## (undated) DEVICE — SUT POLYSORB 2-0 30" GS-21 UNDYED

## (undated) DEVICE — DRSG XEROFORM 1 X 8"

## (undated) DEVICE — SOL IRR POUR NS 0.9% 500ML

## (undated) DEVICE — DRAPE U 47X51" NON STERILE

## (undated) DEVICE — GLV 8 PROTEXIS (BLUE)

## (undated) DEVICE — DRAPE C ARM UNIVERSAL

## (undated) DEVICE — POSITIONER FOAM EGG CRATE ULNAR 2PCS (PINK)

## (undated) DEVICE — TOURNIQUET CUFF 34" DUAL PORT W PLC

## (undated) DEVICE — SUCTION YANKAUER NO CONTROL VENT

## (undated) DEVICE — DRAPE C ARM C-ARMOUR

## (undated) DEVICE — BLADE SCALPEL SAFETYLOCK #15

## (undated) DEVICE — POSITIONER CUSHION INSERT PRONE VIEW LG

## (undated) DEVICE — DRILL BIT STRYKER ORTHO 2.7MM

## (undated) DEVICE — GLV 8 PROTEXIS (WHITE)